# Patient Record
Sex: MALE | Race: WHITE | NOT HISPANIC OR LATINO | URBAN - METROPOLITAN AREA
[De-identification: names, ages, dates, MRNs, and addresses within clinical notes are randomized per-mention and may not be internally consistent; named-entity substitution may affect disease eponyms.]

---

## 2023-04-19 LAB
ALANINE AMINOTRANSFERASE (SGPT) (U/L) IN SER/PLAS: 17 U/L (ref 10–52)
ALBUMIN (G/DL) IN SER/PLAS: 4.3 G/DL (ref 3.4–5)
ALKALINE PHOSPHATASE (U/L) IN SER/PLAS: 69 U/L (ref 33–136)
ANION GAP IN SER/PLAS: 12 MMOL/L (ref 10–20)
APPEARANCE, URINE: CLEAR
ASPARTATE AMINOTRANSFERASE (SGOT) (U/L) IN SER/PLAS: 17 U/L (ref 9–39)
BASOPHILS (10*3/UL) IN BLOOD BY AUTOMATED COUNT: 0.07 X10E9/L (ref 0–0.1)
BASOPHILS/100 LEUKOCYTES IN BLOOD BY AUTOMATED COUNT: 1 % (ref 0–2)
BILIRUBIN TOTAL (MG/DL) IN SER/PLAS: 0.7 MG/DL (ref 0–1.2)
BILIRUBIN, URINE: NEGATIVE
BLOOD, URINE: NEGATIVE
CALCIUM (MG/DL) IN SER/PLAS: 9.3 MG/DL (ref 8.6–10.3)
CARBON DIOXIDE, TOTAL (MMOL/L) IN SER/PLAS: 25 MMOL/L (ref 21–32)
CHLORIDE (MMOL/L) IN SER/PLAS: 107 MMOL/L (ref 98–107)
CHOLESTEROL (MG/DL) IN SER/PLAS: 160 MG/DL (ref 0–199)
CHOLESTEROL IN HDL (MG/DL) IN SER/PLAS: 45.3 MG/DL
CHOLESTEROL/HDL RATIO: 3.5
COLOR, URINE: YELLOW
CREATININE (MG/DL) IN SER/PLAS: 1.33 MG/DL (ref 0.5–1.3)
EOSINOPHILS (10*3/UL) IN BLOOD BY AUTOMATED COUNT: 0.17 X10E9/L (ref 0–0.7)
EOSINOPHILS/100 LEUKOCYTES IN BLOOD BY AUTOMATED COUNT: 2.5 % (ref 0–6)
ERYTHROCYTE DISTRIBUTION WIDTH (RATIO) BY AUTOMATED COUNT: 13.2 % (ref 11.5–14.5)
ERYTHROCYTE MEAN CORPUSCULAR HEMOGLOBIN CONCENTRATION (G/DL) BY AUTOMATED: 32.4 G/DL (ref 32–36)
ERYTHROCYTE MEAN CORPUSCULAR VOLUME (FL) BY AUTOMATED COUNT: 94 FL (ref 80–100)
ERYTHROCYTES (10*6/UL) IN BLOOD BY AUTOMATED COUNT: 4.54 X10E12/L (ref 4.5–5.9)
GFR MALE: 59 ML/MIN/1.73M2
GLUCOSE (MG/DL) IN SER/PLAS: 95 MG/DL (ref 74–99)
GLUCOSE, URINE: NEGATIVE MG/DL
HEMATOCRIT (%) IN BLOOD BY AUTOMATED COUNT: 42.9 % (ref 41–52)
HEMOGLOBIN (G/DL) IN BLOOD: 13.9 G/DL (ref 13.5–17.5)
IMMATURE GRANULOCYTES/100 LEUKOCYTES IN BLOOD BY AUTOMATED COUNT: 0.3 % (ref 0–0.9)
KETONES, URINE: NEGATIVE MG/DL
LDL: 91 MG/DL (ref 0–99)
LEUKOCYTE ESTERASE, URINE: ABNORMAL
LEUKOCYTES (10*3/UL) IN BLOOD BY AUTOMATED COUNT: 6.8 X10E9/L (ref 4.4–11.3)
LYMPHOCYTES (10*3/UL) IN BLOOD BY AUTOMATED COUNT: 2.52 X10E9/L (ref 1.2–4.8)
LYMPHOCYTES/100 LEUKOCYTES IN BLOOD BY AUTOMATED COUNT: 37.2 % (ref 13–44)
MONOCYTES (10*3/UL) IN BLOOD BY AUTOMATED COUNT: 0.6 X10E9/L (ref 0.1–1)
MONOCYTES/100 LEUKOCYTES IN BLOOD BY AUTOMATED COUNT: 8.8 % (ref 2–10)
NEUTROPHILS (10*3/UL) IN BLOOD BY AUTOMATED COUNT: 3.4 X10E9/L (ref 1.2–7.7)
NEUTROPHILS/100 LEUKOCYTES IN BLOOD BY AUTOMATED COUNT: 50.2 % (ref 40–80)
NITRITE, URINE: NEGATIVE
PH, URINE: 5 (ref 5–8)
PLATELETS (10*3/UL) IN BLOOD AUTOMATED COUNT: 248 X10E9/L (ref 150–450)
POTASSIUM (MMOL/L) IN SER/PLAS: 4 MMOL/L (ref 3.5–5.3)
PROSTATE SPECIFIC AG (NG/ML) IN SER/PLAS: 0.55 NG/ML (ref 0–4)
PROTEIN TOTAL: 6.4 G/DL (ref 6.4–8.2)
PROTEIN, URINE: NEGATIVE MG/DL
RBC, URINE: <1 /HPF (ref 0–5)
SODIUM (MMOL/L) IN SER/PLAS: 140 MMOL/L (ref 136–145)
SPECIFIC GRAVITY, URINE: 1.02 (ref 1–1.03)
THYROTROPIN (MIU/L) IN SER/PLAS BY DETECTION LIMIT <= 0.05 MIU/L: 1.88 MIU/L (ref 0.44–3.98)
TRIGLYCERIDE (MG/DL) IN SER/PLAS: 117 MG/DL (ref 0–149)
UREA NITROGEN (MG/DL) IN SER/PLAS: 26 MG/DL (ref 6–23)
UROBILINOGEN, URINE: <2 MG/DL (ref 0–1.9)
VLDL: 23 MG/DL (ref 0–40)
WBC, URINE: 1 /HPF (ref 0–5)

## 2023-10-13 PROBLEM — M54.16 RADICULOPATHY, LUMBAR REGION: Status: ACTIVE | Noted: 2023-10-13

## 2023-10-13 PROBLEM — E03.9 ADULT HYPOTHYROIDISM: Status: ACTIVE | Noted: 2023-10-13

## 2023-10-13 PROBLEM — M51.9 LUMBOSACRAL DISC DISEASE: Status: ACTIVE | Noted: 2023-10-13

## 2023-10-13 PROBLEM — D12.6 TUBULAR ADENOMA OF COLON: Status: ACTIVE | Noted: 2023-10-13

## 2023-10-13 PROBLEM — H90.3 BILATERAL SENSORINEURAL HEARING LOSS: Status: ACTIVE | Noted: 2023-10-13

## 2023-10-13 PROBLEM — E78.5 HYPERLIPIDEMIA: Status: ACTIVE | Noted: 2023-10-13

## 2023-10-13 PROBLEM — I10 HYPERTENSION: Status: ACTIVE | Noted: 2023-10-13

## 2023-10-13 PROBLEM — S81.819A LACERATION OF LOWER LIMB: Status: ACTIVE | Noted: 2023-10-13

## 2023-10-13 PROBLEM — E55.9 VITAMIN D DEFICIENCY: Status: ACTIVE | Noted: 2023-10-13

## 2023-10-13 PROBLEM — E78.00 HYPERCHOLESTEROLEMIA: Status: ACTIVE | Noted: 2023-10-13

## 2023-10-13 PROBLEM — J30.9 ALLERGIC RHINITIS: Status: ACTIVE | Noted: 2023-10-13

## 2023-10-13 PROBLEM — M19.90 ARTHRITIS: Status: ACTIVE | Noted: 2023-10-13

## 2023-10-13 PROBLEM — R42 ORTHOSTATIC DIZZINESS: Status: ACTIVE | Noted: 2023-10-13

## 2023-10-13 PROBLEM — M48.062 SPINAL STENOSIS OF LUMBAR REGION WITH NEUROGENIC CLAUDICATION: Status: ACTIVE | Noted: 2023-10-13

## 2023-10-13 PROBLEM — M43.00 SPONDYLOLYSIS: Status: ACTIVE | Noted: 2023-10-13

## 2023-10-13 RX ORDER — ATORVASTATIN CALCIUM 40 MG/1
1 TABLET, FILM COATED ORAL DAILY
COMMUNITY
Start: 2017-04-20 | End: 2023-10-16 | Stop reason: SDUPTHER

## 2023-10-13 RX ORDER — LOSARTAN POTASSIUM AND HYDROCHLOROTHIAZIDE 12.5; 5 MG/1; MG/1
1 TABLET ORAL DAILY
COMMUNITY
Start: 2018-10-23 | End: 2023-10-16 | Stop reason: ALTCHOICE

## 2023-10-13 RX ORDER — MELOXICAM 15 MG/1
15 TABLET ORAL DAILY
COMMUNITY
Start: 2021-05-05 | End: 2023-10-16

## 2023-10-13 RX ORDER — MV-MIN/FOLIC/K1/LYCOPEN/LUTEIN 300-60 MCG
TABLET ORAL
COMMUNITY

## 2023-10-13 RX ORDER — CYCLOBENZAPRINE HCL 10 MG
TABLET ORAL
COMMUNITY
Start: 2022-08-10 | End: 2023-11-28 | Stop reason: SDUPTHER

## 2023-10-13 RX ORDER — VALSARTAN AND HYDROCHLOROTHIAZIDE 160; 12.5 MG/1; MG/1
TABLET, FILM COATED ORAL
COMMUNITY
Start: 2020-01-24 | End: 2023-10-16 | Stop reason: SDUPTHER

## 2023-10-16 ENCOUNTER — OFFICE VISIT (OUTPATIENT)
Dept: PRIMARY CARE | Facility: CLINIC | Age: 68
End: 2023-10-16
Payer: COMMERCIAL

## 2023-10-16 VITALS
SYSTOLIC BLOOD PRESSURE: 130 MMHG | HEART RATE: 63 BPM | HEIGHT: 74 IN | BODY MASS INDEX: 28.11 KG/M2 | WEIGHT: 219 LBS | OXYGEN SATURATION: 98 % | RESPIRATION RATE: 16 BRPM | DIASTOLIC BLOOD PRESSURE: 90 MMHG | TEMPERATURE: 97.5 F

## 2023-10-16 DIAGNOSIS — I10 HYPERTENSION, UNSPECIFIED TYPE: Primary | ICD-10-CM

## 2023-10-16 DIAGNOSIS — E78.00 PURE HYPERCHOLESTEROLEMIA: ICD-10-CM

## 2023-10-16 DIAGNOSIS — I10 ESSENTIAL HYPERTENSION, BENIGN: Primary | ICD-10-CM

## 2023-10-16 DIAGNOSIS — M51.9 LUMBOSACRAL DISC DISEASE: ICD-10-CM

## 2023-10-16 DIAGNOSIS — M19.90 ARTHRITIS: ICD-10-CM

## 2023-10-16 PROCEDURE — 1159F MED LIST DOCD IN RCRD: CPT | Performed by: INTERNAL MEDICINE

## 2023-10-16 PROCEDURE — 3080F DIAST BP >= 90 MM HG: CPT | Performed by: INTERNAL MEDICINE

## 2023-10-16 PROCEDURE — 3075F SYST BP GE 130 - 139MM HG: CPT | Performed by: INTERNAL MEDICINE

## 2023-10-16 PROCEDURE — 99214 OFFICE O/P EST MOD 30 MIN: CPT | Performed by: INTERNAL MEDICINE

## 2023-10-16 RX ORDER — VALSARTAN AND HYDROCHLOROTHIAZIDE 160; 12.5 MG/1; MG/1
1 TABLET, FILM COATED ORAL DAILY
Qty: 90 TABLET | Refills: 1 | Status: SHIPPED | OUTPATIENT
Start: 2023-10-16 | End: 2024-04-16 | Stop reason: SDUPTHER

## 2023-10-16 RX ORDER — ATORVASTATIN CALCIUM 40 MG/1
40 TABLET, FILM COATED ORAL DAILY
Qty: 90 TABLET | Refills: 1 | Status: SHIPPED | OUTPATIENT
Start: 2023-10-16 | End: 2024-03-04 | Stop reason: SDUPTHER

## 2023-10-16 ASSESSMENT — ENCOUNTER SYMPTOMS
ARTHRALGIAS: 1
NUMBNESS: 0
ABDOMINAL PAIN: 0
DIZZINESS: 0
PSYCHIATRIC NEGATIVE: 1
FREQUENCY: 0
ACTIVITY CHANGE: 0
PALPITATIONS: 0
JOINT SWELLING: 0
ADENOPATHY: 0
FEVER: 0
ENDOCRINE NEGATIVE: 1
DYSURIA: 0
COUGH: 0
FATIGUE: 0
BLOOD IN STOOL: 0
HEADACHES: 0
CONSTIPATION: 0
BACK PAIN: 1
ALLERGIC/IMMUNOLOGIC NEGATIVE: 1
SHORTNESS OF BREATH: 0
CHEST TIGHTNESS: 0

## 2023-10-16 NOTE — PROGRESS NOTES
"Subjective   Patient ID: Mu Wallace is a 67 y.o. male who presents for 6 Month Follow Up .    He is doing well, feels fine. He walks daily for exercise. . No fever , chest pain , dyspnea, hedache , dizziness, nausea , palpitations. No dysuria , constipation , bleeding.  He has seen chiropractor for back pain and he is doing more stretching exercises, and it has helped. He plays golf in summer.  He is retired.         Review of Systems   Constitutional:  Negative for activity change, fatigue and fever.   HENT:  Negative for congestion.    Eyes:  Negative for visual disturbance.   Respiratory:  Negative for cough, chest tightness and shortness of breath.    Cardiovascular:  Negative for chest pain, palpitations and leg swelling.   Gastrointestinal:  Negative for abdominal pain, blood in stool and constipation.   Endocrine: Negative.    Genitourinary:  Negative for dysuria and frequency.   Musculoskeletal:  Positive for arthralgias and back pain. Negative for gait problem and joint swelling.   Allergic/Immunologic: Negative.    Neurological:  Negative for dizziness, numbness and headaches.   Hematological:  Negative for adenopathy.   Psychiatric/Behavioral: Negative.         Objective   /90 (BP Location: Left arm, Patient Position: Sitting, BP Cuff Size: Adult)   Pulse 63   Temp 36.4 °C (97.5 °F)   Resp 16   Ht 1.88 m (6' 2\")   Wt 99.3 kg (219 lb)   SpO2 98%   BMI 28.12 kg/m²     Physical Exam  Constitutional:       Appearance: Normal appearance.      Comments: He wears glasses   HENT:      Nose: No congestion.      Mouth/Throat:      Mouth: Mucous membranes are moist.      Pharynx: Oropharynx is clear.   Eyes:      Conjunctiva/sclera: Conjunctivae normal.   Cardiovascular:      Rate and Rhythm: Normal rate and regular rhythm.      Pulses: Normal pulses.      Heart sounds: Normal heart sounds. No murmur heard.  Pulmonary:      Effort: Pulmonary effort is normal.      Breath sounds: Normal breath " sounds.   Abdominal:      General: Abdomen is flat. Bowel sounds are normal.      Palpations: Abdomen is soft.   Musculoskeletal:         General: Normal range of motion.      Cervical back: Normal range of motion and neck supple.      Right lower leg: No edema.      Left lower leg: No edema.   Skin:     General: Skin is warm and dry.      Capillary Refill: Capillary refill takes more than 3 seconds.   Neurological:      General: No focal deficit present.      Mental Status: He is alert and oriented to person, place, and time.   Psychiatric:         Mood and Affect: Mood normal.         Behavior: Behavior normal.         Assessment/Plan   Problem List Items Addressed This Visit       Arthritis, Osteo multiple sites  H/o left meniscus repair  Regular activity  Meloxicam   Stretching exercises  He has seen a chiropractor      Hyperlipidemia  Low fat diet  Statin      Hypertension   Valsartan hydrochlorothiazide  CANDELARIA diet      Lumbosacral disc disease  Pain control  Back stretching exercises  Meloxicam   Regular activity, he plays golf       Vision check  He is uptodate for immunizations  He had flushot, RSV and Covid booster  Labs reviewed , 04/23

## 2023-11-28 DIAGNOSIS — M51.9 LUMBOSACRAL DISC DISEASE: Primary | ICD-10-CM

## 2023-11-28 NOTE — TELEPHONE ENCOUNTER
Patient tweaked his back standing peeling potatoes on Thanksgiving.  Pain in hips and lower back.

## 2023-11-29 RX ORDER — CYCLOBENZAPRINE HCL 10 MG
10 TABLET ORAL NIGHTLY PRN
Qty: 30 TABLET | Refills: 0 | Status: SHIPPED | OUTPATIENT
Start: 2023-11-29 | End: 2024-04-16 | Stop reason: SDUPTHER

## 2024-03-04 DIAGNOSIS — E78.00 PURE HYPERCHOLESTEROLEMIA: ICD-10-CM

## 2024-03-04 RX ORDER — ATORVASTATIN CALCIUM 40 MG/1
40 TABLET, FILM COATED ORAL DAILY
Qty: 90 TABLET | Refills: 0 | Status: SHIPPED
Start: 2024-03-04 | End: 2024-04-16 | Stop reason: SDUPTHER

## 2024-04-15 DIAGNOSIS — I10 HYPERTENSION, UNSPECIFIED TYPE: ICD-10-CM

## 2024-04-15 DIAGNOSIS — Z00.00 WELLNESS EXAMINATION: Primary | ICD-10-CM

## 2024-04-15 DIAGNOSIS — M51.9 LUMBOSACRAL DISC DISEASE: ICD-10-CM

## 2024-04-15 DIAGNOSIS — E78.00 PURE HYPERCHOLESTEROLEMIA: ICD-10-CM

## 2024-04-16 ENCOUNTER — OFFICE VISIT (OUTPATIENT)
Dept: PRIMARY CARE | Facility: CLINIC | Age: 69
End: 2024-04-16
Payer: COMMERCIAL

## 2024-04-16 ENCOUNTER — LAB (OUTPATIENT)
Dept: LAB | Facility: LAB | Age: 69
End: 2024-04-16
Payer: COMMERCIAL

## 2024-04-16 VITALS
OXYGEN SATURATION: 97 % | HEART RATE: 76 BPM | WEIGHT: 220 LBS | BODY MASS INDEX: 27.35 KG/M2 | DIASTOLIC BLOOD PRESSURE: 80 MMHG | SYSTOLIC BLOOD PRESSURE: 122 MMHG | HEIGHT: 75 IN

## 2024-04-16 DIAGNOSIS — E78.00 PURE HYPERCHOLESTEROLEMIA: ICD-10-CM

## 2024-04-16 DIAGNOSIS — L84 CALLUS OF HEEL: Primary | ICD-10-CM

## 2024-04-16 DIAGNOSIS — M51.9 LUMBOSACRAL DISC DISEASE: ICD-10-CM

## 2024-04-16 DIAGNOSIS — Z00.00 WELLNESS EXAMINATION: ICD-10-CM

## 2024-04-16 DIAGNOSIS — M51.36 OTHER INTERVERTEBRAL DISC DEGENERATION, LUMBAR REGION: ICD-10-CM

## 2024-04-16 DIAGNOSIS — I10 ESSENTIAL HYPERTENSION, BENIGN: ICD-10-CM

## 2024-04-16 DIAGNOSIS — I10 HYPERTENSION, UNSPECIFIED TYPE: ICD-10-CM

## 2024-04-16 LAB
ALBUMIN SERPL BCP-MCNC: 4.3 G/DL (ref 3.4–5)
ALP SERPL-CCNC: 76 U/L (ref 33–136)
ALT SERPL W P-5'-P-CCNC: 16 U/L (ref 10–52)
ANION GAP SERPL CALC-SCNC: 9 MMOL/L (ref 10–20)
APPEARANCE UR: CLEAR
AST SERPL W P-5'-P-CCNC: 17 U/L (ref 9–39)
BASOPHILS # BLD AUTO: 0.06 X10*3/UL (ref 0–0.1)
BASOPHILS NFR BLD AUTO: 0.8 %
BILIRUB SERPL-MCNC: 0.8 MG/DL (ref 0–1.2)
BILIRUB UR STRIP.AUTO-MCNC: NEGATIVE MG/DL
BUN SERPL-MCNC: 17 MG/DL (ref 6–23)
CALCIUM SERPL-MCNC: 9 MG/DL (ref 8.6–10.3)
CHLORIDE SERPL-SCNC: 106 MMOL/L (ref 98–107)
CHOLEST SERPL-MCNC: 136 MG/DL (ref 0–199)
CHOLESTEROL/HDL RATIO: 3.3
CO2 SERPL-SCNC: 28 MMOL/L (ref 21–32)
COLOR UR: YELLOW
CREAT SERPL-MCNC: 1.21 MG/DL (ref 0.5–1.3)
EGFRCR SERPLBLD CKD-EPI 2021: 66 ML/MIN/1.73M*2
EOSINOPHIL # BLD AUTO: 0.21 X10*3/UL (ref 0–0.7)
EOSINOPHIL NFR BLD AUTO: 2.9 %
ERYTHROCYTE [DISTWIDTH] IN BLOOD BY AUTOMATED COUNT: 13.2 % (ref 11.5–14.5)
GLUCOSE SERPL-MCNC: 94 MG/DL (ref 74–99)
GLUCOSE UR STRIP.AUTO-MCNC: NEGATIVE MG/DL
HCT VFR BLD AUTO: 42.3 % (ref 41–52)
HDLC SERPL-MCNC: 41.6 MG/DL
HGB BLD-MCNC: 13.5 G/DL (ref 13.5–17.5)
IMM GRANULOCYTES # BLD AUTO: 0.02 X10*3/UL (ref 0–0.7)
IMM GRANULOCYTES NFR BLD AUTO: 0.3 % (ref 0–0.9)
KETONES UR STRIP.AUTO-MCNC: NEGATIVE MG/DL
LDLC SERPL CALC-MCNC: 67 MG/DL
LEUKOCYTE ESTERASE UR QL STRIP.AUTO: NEGATIVE
LYMPHOCYTES # BLD AUTO: 2.36 X10*3/UL (ref 1.2–4.8)
LYMPHOCYTES NFR BLD AUTO: 32.3 %
MCH RBC QN AUTO: 31 PG (ref 26–34)
MCHC RBC AUTO-ENTMCNC: 31.9 G/DL (ref 32–36)
MCV RBC AUTO: 97 FL (ref 80–100)
MONOCYTES # BLD AUTO: 0.7 X10*3/UL (ref 0.1–1)
MONOCYTES NFR BLD AUTO: 9.6 %
NEUTROPHILS # BLD AUTO: 3.95 X10*3/UL (ref 1.2–7.7)
NEUTROPHILS NFR BLD AUTO: 54.1 %
NITRITE UR QL STRIP.AUTO: NEGATIVE
NON HDL CHOLESTEROL: 94 MG/DL (ref 0–149)
NRBC BLD-RTO: 0 /100 WBCS (ref 0–0)
PH UR STRIP.AUTO: 6 [PH]
PLATELET # BLD AUTO: 267 X10*3/UL (ref 150–450)
POTASSIUM SERPL-SCNC: 3.3 MMOL/L (ref 3.5–5.3)
PROT SERPL-MCNC: 6.3 G/DL (ref 6.4–8.2)
PROT UR STRIP.AUTO-MCNC: NEGATIVE MG/DL
PSA SERPL-MCNC: 0.82 NG/ML
RBC # BLD AUTO: 4.35 X10*6/UL (ref 4.5–5.9)
RBC # UR STRIP.AUTO: NEGATIVE /UL
SODIUM SERPL-SCNC: 140 MMOL/L (ref 136–145)
SP GR UR STRIP.AUTO: 1.01
TRIGL SERPL-MCNC: 135 MG/DL (ref 0–149)
UROBILINOGEN UR STRIP.AUTO-MCNC: <2 MG/DL
VLDL: 27 MG/DL (ref 0–40)
WBC # BLD AUTO: 7.3 X10*3/UL (ref 4.4–11.3)

## 2024-04-16 PROCEDURE — 80061 LIPID PANEL: CPT

## 2024-04-16 PROCEDURE — 1159F MED LIST DOCD IN RCRD: CPT | Performed by: INTERNAL MEDICINE

## 2024-04-16 PROCEDURE — 84153 ASSAY OF PSA TOTAL: CPT

## 2024-04-16 PROCEDURE — 99214 OFFICE O/P EST MOD 30 MIN: CPT | Performed by: INTERNAL MEDICINE

## 2024-04-16 PROCEDURE — 80053 COMPREHEN METABOLIC PANEL: CPT

## 2024-04-16 PROCEDURE — 81003 URINALYSIS AUTO W/O SCOPE: CPT

## 2024-04-16 PROCEDURE — 3079F DIAST BP 80-89 MM HG: CPT | Performed by: INTERNAL MEDICINE

## 2024-04-16 PROCEDURE — 99397 PER PM REEVAL EST PAT 65+ YR: CPT | Performed by: INTERNAL MEDICINE

## 2024-04-16 PROCEDURE — 36415 COLL VENOUS BLD VENIPUNCTURE: CPT

## 2024-04-16 PROCEDURE — 3074F SYST BP LT 130 MM HG: CPT | Performed by: INTERNAL MEDICINE

## 2024-04-16 PROCEDURE — 85025 COMPLETE CBC W/AUTO DIFF WBC: CPT

## 2024-04-16 RX ORDER — MELOXICAM 15 MG/1
TABLET ORAL
Qty: 90 TABLET | Refills: 1 | Status: SHIPPED | OUTPATIENT
Start: 2024-04-16

## 2024-04-16 RX ORDER — VALSARTAN AND HYDROCHLOROTHIAZIDE 160; 12.5 MG/1; MG/1
1 TABLET, FILM COATED ORAL DAILY
Qty: 90 TABLET | Refills: 1 | Status: SHIPPED | OUTPATIENT
Start: 2024-04-16 | End: 2024-05-30

## 2024-04-16 RX ORDER — CYCLOBENZAPRINE HCL 10 MG
10 TABLET ORAL NIGHTLY PRN
Qty: 30 TABLET | Refills: 0 | Status: SHIPPED | OUTPATIENT
Start: 2024-04-16

## 2024-04-16 RX ORDER — ATORVASTATIN CALCIUM 40 MG/1
40 TABLET, FILM COATED ORAL DAILY
Qty: 90 TABLET | Refills: 1 | Status: SHIPPED | OUTPATIENT
Start: 2024-04-16 | End: 2024-06-11

## 2024-04-16 ASSESSMENT — ENCOUNTER SYMPTOMS
COUGH: 0
DYSURIA: 0
HEADACHES: 0
CONSTIPATION: 0
PSYCHIATRIC NEGATIVE: 1
JOINT SWELLING: 0
BLOOD IN STOOL: 0
FATIGUE: 0
CHEST TIGHTNESS: 0
DIZZINESS: 0
ADENOPATHY: 0
BACK PAIN: 1
ACTIVITY CHANGE: 0
NUMBNESS: 0
PALPITATIONS: 0
FEVER: 0
SHORTNESS OF BREATH: 0
ABDOMINAL PAIN: 0
FREQUENCY: 0
ARTHRALGIAS: 1
ENDOCRINE NEGATIVE: 1
ALLERGIC/IMMUNOLOGIC NEGATIVE: 1

## 2024-04-16 NOTE — PROGRESS NOTES
Subjective   Patient ID: Mu Wallace is a 68 y.o. male who presents for Follow-up (6 MONTH FOLLOW UP).  And Wellness Exam    He is doing well, feels fine. He walks daily for exercise. . No fever , chest pain , dyspnea, hedache , dizziness, nausea , palpitations. No dysuria , constipation , bleeding.  He has seen chiropractor for back pain and he is doing more stretching exercises, and it has helped. He plays golf in summer.  He is retired. He has right foot callus and great toenail fungal infection       PMH:  Immun/Inj. Record:  91300-Covid 19 Pfizer Sars-Cov-2 vac mRNA, LNP-S, PF, 30 mcg/ 0.3 mL 00/00/00 00/00/00 10/22/21  90750-Shingrix (Shingles) Vaccine Recombinant  [id number] 22  11387-Zdubuqjy  0.65 ML [id number] 22  30974-Eyxzkvzncgss Vaccine 22  90715-Tdap Vaccine 7 Yrs &>  NDC 86629-100-91 09/15/21  31134-Tec Quadrivalent 54121-435-94 21    Medical Problems:  Essential Hypertension, Hyperlipidemia  Osteoarthritis - Knees and back , hips  Degenerative Disk Disease - L-S spine  Seasonal Allergies  Accidents:  None  Colonoscopy , Dr Smiley at [facility]   Left knee meniscus repair  L-S spine , Dr Gume Allison           FH:  Father:  , CVA , MI.  Mother:  , old age.  Sister 1:  , Seizure disorder.    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  Personal Habits:  Cigarette Use: Former Cigarette Smoker - quit for 5 years , smoked 1 ppd.Alcohol: Denies alcohol use.Drug Use: Current Drug User - Marijuana.    Review of Systems   Constitutional:  Negative for activity change, fatigue and fever.   HENT:  Negative for congestion.    Eyes:  Negative for visual disturbance.   Respiratory:  Negative for cough, chest tightness and shortness of breath.    Cardiovascular:  Negative for chest pain, palpitations and leg swelling.   Gastrointestinal:  Negative for abdominal pain, blood in stool and constipation.   Endocrine: Negative.   "  Genitourinary:  Negative for dysuria and frequency.   Musculoskeletal:  Positive for arthralgias and back pain. Negative for gait problem and joint swelling.   Allergic/Immunologic: Negative.    Neurological:  Negative for dizziness, numbness and headaches.   Hematological:  Negative for adenopathy.   Psychiatric/Behavioral: Negative.         Objective   /90 (BP Location: Left arm, Patient Position: Sitting, BP Cuff Size: Adult)   Pulse 63   Temp 36.4 °C (97.5 °F)   Resp 16   Ht 1.88 m (6' 2\")   Wt 99.3 kg (219 lb)   SpO2 98%   BMI 28.12 kg/m²     Physical Exam  Constitutional:       Appearance: Normal appearance.      Comments: He wears glasses   HENT:      Nose: No congestion.      Mouth/Throat:      Mouth: Mucous membranes are moist.      Pharynx: Oropharynx is clear.   Eyes:      Conjunctiva/sclera: Conjunctivae normal.   Cardiovascular:      Rate and Rhythm: Normal rate and regular rhythm.      Pulses: Normal pulses.      Heart sounds: Normal heart sounds. No murmur heard.  Pulmonary:      Effort: Pulmonary effort is normal.      Breath sounds: Normal breath sounds.   Abdominal:      General: Abdomen is flat. Bowel sounds are normal.      Palpations: Abdomen is soft.   Musculoskeletal:         General: Normal range of motion.      Cervical back: Normal range of motion and neck supple.      Right lower leg: No edema.      Left lower leg: No edema.   Skin:     General: Skin is warm and dry.      Capillary Refill: Capillary refill takes more than 3 seconds.   Neurological:      General: No focal deficit present.      Mental Status: He is alert and oriented to person, place, and time.   Psychiatric:         Mood and Affect: Mood normal.         Behavior: Behavior normal.         Assessment/Plan   Problem List Items Addressed This Visit       Arthritis, Osteo multiple sites  H/o left meniscus repair  Regular activity  Meloxicam   Stretching exercises  He has seen a chiropractor      " Hyperlipidemia  Low fat diet  Statin      Hypertension   Valsartan hydrochlorothiazide  CANDELARIA diet      Lumbosacral disc disease  Pain control  Back stretching exercises  Meloxicam   Voltaren Gel , Lidocaine patches   Regular activity, he plays golf       Vision check  He is uptodate for immunizations  He had flushot, RSV and Covid booster  Check Labs   Colonoscopy , not due. Last done 06/21  Refer to Dr DAO for feet problems

## 2024-04-17 ENCOUNTER — TELEPHONE (OUTPATIENT)
Dept: PRIMARY CARE | Facility: CLINIC | Age: 69
End: 2024-04-17
Payer: COMMERCIAL

## 2024-04-17 NOTE — TELEPHONE ENCOUNTER
----- Message from Terrell Moore MD sent at 4/17/2024  8:30 AM EDT -----  Unremarkable labs  K 3.3 , slight low  No new recommendations

## 2024-05-24 ENCOUNTER — APPOINTMENT (OUTPATIENT)
Dept: PODIATRY | Facility: CLINIC | Age: 69
End: 2024-05-24
Payer: COMMERCIAL

## 2024-05-30 ENCOUNTER — OFFICE VISIT (OUTPATIENT)
Dept: PODIATRY | Facility: CLINIC | Age: 69
End: 2024-05-30
Payer: COMMERCIAL

## 2024-05-30 DIAGNOSIS — L84 CORNS AND CALLOSITIES: ICD-10-CM

## 2024-05-30 DIAGNOSIS — M79.672 LEFT FOOT PAIN: ICD-10-CM

## 2024-05-30 DIAGNOSIS — L84 CALLUS OF HEEL: ICD-10-CM

## 2024-05-30 DIAGNOSIS — M79.671 RIGHT FOOT PAIN: ICD-10-CM

## 2024-05-30 DIAGNOSIS — B35.1 TINEA UNGUIUM: Primary | ICD-10-CM

## 2024-05-30 PROCEDURE — 1159F MED LIST DOCD IN RCRD: CPT | Performed by: PODIATRIST

## 2024-05-30 PROCEDURE — 1160F RVW MEDS BY RX/DR IN RCRD: CPT | Performed by: PODIATRIST

## 2024-05-30 PROCEDURE — 11055 PARING/CUTG B9 HYPRKER LES 1: CPT | Performed by: PODIATRIST

## 2024-05-30 PROCEDURE — 99202 OFFICE O/P NEW SF 15 MIN: CPT | Performed by: PODIATRIST

## 2024-05-31 NOTE — PROGRESS NOTES
CC: calluses, nail fungus    HPI:  Patient see as new pt with calluses to the feet and nail fungus, present for a period of time, has tried some otc callus remover.    PCP: Dr. Moore  Last visit: 4-16-24     PMH  Past Medical History:   Diagnosis Date    Hyperlipidemia, unspecified 07/15/2018    Hyperlipidemia    Unspecified osteoarthritis, unspecified site 07/15/2018    Arthritis     MEDS    Current Outpatient Medications:     atorvastatin (Lipitor) 40 mg tablet, Take 1 tablet (40 mg) by mouth once daily., Disp: 90 tablet, Rfl: 1    cholecalciferol, vitamin D3, 100 mcg (4,000 unit) capsule, Take 1 capsule (100 mcg) by mouth once daily., Disp: , Rfl:     cyclobenzaprine (Flexeril) 10 mg tablet, Take 1 tablet (10 mg) by mouth as needed at bedtime for muscle spasms., Disp: 30 tablet, Rfl: 0    meloxicam (Mobic) 15 mg tablet, TAKE 1 TAB BY MOUTH EVERY DAY WITH MEAL/FOOD, Disp: 90 tablet, Rfl: 1    mv-min-folic-K1-lycopen-lutein (Centrum Silver Ultra Men's) 790--300 mcg tablet, Take by mouth., Disp: , Rfl:     valsartan-hydrochlorothiazide (Diovan-HCT) 160-12.5 mg tablet, TAKE ONE TABLET BY MOUTH ONCE DAILY, Disp: 90 tablet, Rfl: 0  Allergies  No Known Allergies  Social History     Socioeconomic History    Marital status:      Spouse name: None    Number of children: None    Years of education: None    Highest education level: None   Occupational History    None   Tobacco Use    Smoking status: None    Smokeless tobacco: None   Substance and Sexual Activity    Alcohol use: None    Drug use: None    Sexual activity: None   Other Topics Concern    None   Social History Narrative    None     Social Determinants of Health     Financial Resource Strain: Not on file   Food Insecurity: Not on file   Transportation Needs: Not on file   Physical Activity: Not on file   Stress: Not on file   Social Connections: Not on file   Intimate Partner Violence: Not on file   Housing Stability: Not on file     Family History    Problem Relation Name Age of Onset    Other (cardiac disorder) Father       Past Surgical History:   Procedure Laterality Date    BACK SURGERY  04/03/2018    Lower Back Surgery    KNEE SURGERY  04/06/2018    Knee Surgery       REVIEW OF SYSTEMS    + as noted above in HPI.       Physical examination:   On General Observation: Patient is a pleasant, cooperative, well developed 68 y.o.  adult male. The patient is alert and oriented to time, place and person. Patient has normal affect and mood.  There were no vitals taken for this visit.    Vascular:  DP and PT pulses are 2/4 b/l.  no edema noted. no varicosities b/l.  CFT  5 seconds to all digits bilateral.  Skin temperature is warm to warm from proximal to distal bilateral.      Muscular: Strength is 5/5 b/l.  Motor strength is normal and symmetric proximally, as well as distally, in all four extremities. Good mobility of all extremities.  Tenderness to feet.     Neuro:  Proprioception present.   Sensation to vibration is  present. Protective sensation intact  at all pedal sites via [place] Suzie 5.07 monofilament bilateral.  Light touch present bilateral.     Derm:  callus is present sub 5th metatarsal head right foot, nail b/l le are thick and crumbly.         ASSESSMENT:    Nail fungus  Callus foot  Pain feet     PLAN:   Consult  A comprehensive history and physical examination were preformed. The patient was educated on clinical findings, diagnosis and treatment plans. Patient understands all that has been explained and all questions were answered to apparent satisfaction.   -reviewed etiology of the above, debrided the callus right foot with 15 blade, reviewed options for nail fungus, will start topical formula 3, did review possible orthotics, follow up 6 months.    Thank you for referral      Adeel John DPM

## 2024-10-16 ENCOUNTER — APPOINTMENT (OUTPATIENT)
Dept: PRIMARY CARE | Facility: CLINIC | Age: 69
End: 2024-10-16
Payer: COMMERCIAL

## 2024-10-16 VITALS
HEART RATE: 66 BPM | TEMPERATURE: 97 F | OXYGEN SATURATION: 96 % | SYSTOLIC BLOOD PRESSURE: 138 MMHG | BODY MASS INDEX: 26.98 KG/M2 | RESPIRATION RATE: 18 BRPM | HEIGHT: 75 IN | WEIGHT: 217 LBS | DIASTOLIC BLOOD PRESSURE: 78 MMHG

## 2024-10-16 DIAGNOSIS — E78.00 PURE HYPERCHOLESTEROLEMIA: ICD-10-CM

## 2024-10-16 DIAGNOSIS — I10 ESSENTIAL HYPERTENSION, BENIGN: ICD-10-CM

## 2024-10-16 DIAGNOSIS — M51.9 LUMBOSACRAL DISC DISEASE: ICD-10-CM

## 2024-10-16 PROCEDURE — 1036F TOBACCO NON-USER: CPT | Performed by: INTERNAL MEDICINE

## 2024-10-16 PROCEDURE — 3075F SYST BP GE 130 - 139MM HG: CPT | Performed by: INTERNAL MEDICINE

## 2024-10-16 PROCEDURE — 99214 OFFICE O/P EST MOD 30 MIN: CPT | Performed by: INTERNAL MEDICINE

## 2024-10-16 PROCEDURE — 3008F BODY MASS INDEX DOCD: CPT | Performed by: INTERNAL MEDICINE

## 2024-10-16 PROCEDURE — 3078F DIAST BP <80 MM HG: CPT | Performed by: INTERNAL MEDICINE

## 2024-10-16 PROCEDURE — 1159F MED LIST DOCD IN RCRD: CPT | Performed by: INTERNAL MEDICINE

## 2024-10-16 RX ORDER — MELOXICAM 15 MG/1
TABLET ORAL
Qty: 90 TABLET | Refills: 1 | Status: SHIPPED | OUTPATIENT
Start: 2024-10-16

## 2024-10-16 RX ORDER — VALSARTAN AND HYDROCHLOROTHIAZIDE 160; 12.5 MG/1; MG/1
1 TABLET, FILM COATED ORAL DAILY
Qty: 90 TABLET | Refills: 1 | Status: SHIPPED | OUTPATIENT
Start: 2024-10-16

## 2024-10-16 RX ORDER — CYCLOBENZAPRINE HCL 10 MG
10 TABLET ORAL NIGHTLY PRN
Qty: 30 TABLET | Refills: 0 | Status: SHIPPED | OUTPATIENT
Start: 2024-10-16

## 2024-10-16 RX ORDER — ATORVASTATIN CALCIUM 40 MG/1
40 TABLET, FILM COATED ORAL DAILY
Qty: 90 TABLET | Refills: 1 | Status: SHIPPED | OUTPATIENT
Start: 2024-10-16

## 2024-10-16 ASSESSMENT — ENCOUNTER SYMPTOMS
ARTHRALGIAS: 1
FEVER: 0
ENDOCRINE NEGATIVE: 1
HEADACHES: 0
ADENOPATHY: 0
PSYCHIATRIC NEGATIVE: 1
JOINT SWELLING: 0
CONSTIPATION: 0
ABDOMINAL PAIN: 0
BLOOD IN STOOL: 0
DYSURIA: 0
BACK PAIN: 1
ACTIVITY CHANGE: 0
PALPITATIONS: 0
ALLERGIC/IMMUNOLOGIC NEGATIVE: 1
COUGH: 0
SHORTNESS OF BREATH: 0
NUMBNESS: 0
CHEST TIGHTNESS: 0
DIZZINESS: 0
FATIGUE: 0
FREQUENCY: 0

## 2024-10-16 NOTE — PROGRESS NOTES
Subjective   Patient ID: Mu Wallace is a 68 y.o. male who presents for Follow-up (6 months).  And Wellness Exam    He is doing well, feels fine. He walks daily for exercise. . No fever , chest pain , dyspnea, hedache , dizziness, nausea , palpitations. No dysuria , constipation , bleeding.  He has seen chiropractor for back pain and he is doing more stretching exercises, and it has helped. He plays golf in summer.He is retired. He saw Dr DAO , Podiatry , denies any current feet problems.       PMH:  Immun/Inj. Record:  91300-Covid 19 Pfizer Sars-Cov-2 vac mRNA, LNP-S, PF, 30 mcg/ 0.3 mL 00/00/00 00/00/00 10/22/21  90750-Shingrix (Shingles) Vaccine Recombinant  [address]-31 22  08446-Aazehimv  0.65 ML [id number] 22  67017-Yyhwvtahjlcp Vaccine 22  90715-Tdap Vaccine 7 Yrs &>  NDC 11861-085-28 09/15/21  03359-Jtw Quadrivalent [id number] 21    Medical Problems:  Essential Hypertension, Hyperlipidemia  Osteoarthritis - Knees and back , hips  Degenerative Disk Disease - L-S spine  Seasonal Allergies  Accidents:  None  Colonoscopy , Dr Smiley at [facility]   Left knee meniscus repair  L-S spine , Dr Gume Allison           FH:  Father:  , CVA , MI.  Mother:  , old age.  Sister 1:  , Seizure disorder.    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 He has retired.  Personal Habits:  Cigarette Use: Former Cigarette Smoker - quit for 5 years , smoked 1 ppd.Alcohol: Denies alcohol use.Drug Use: Current Drug User - Marijuana.    Review of Systems   Constitutional:  Negative for activity change, fatigue and fever.   HENT:  Negative for congestion.    Eyes:  Negative for visual disturbance.   Respiratory:  Negative for cough, chest tightness and shortness of breath.    Cardiovascular:  Negative for chest pain, palpitations and leg swelling.   Gastrointestinal:  Negative for abdominal pain, blood in stool and constipation.   Endocrine: Negative.    Genitourinary:   "Negative for dysuria and frequency.   Musculoskeletal:  Positive for arthralgias and back pain. Negative for gait problem and joint swelling.        Bilateral wrist, hand pain   Allergic/Immunologic: Negative.    Neurological:  Negative for dizziness, numbness and headaches.   Hematological:  Negative for adenopathy.   Psychiatric/Behavioral: Negative.         Objective   10/16/2024     8:01 AM      /78   BP Location Left arm   Patient Position Sitting   BP Cuff Size Adult   Heart Rate 66   Temp 36.1 °C (97 °F)   Temp Source Temporal   Weight 98.4 kg (217 lb)   Height 1.905 m (6' 3\")   Resp 18   SpO2 96 %     BMI: 27.12 kg/m2   BSA: 2.28 m2         Physical Exam  Constitutional:       Appearance: Normal appearance.      Comments: He wears glasses   HENT:      Nose: No congestion.      Mouth/Throat:      Mouth: Mucous membranes are moist.      Pharynx: Oropharynx is clear.   Eyes:      Conjunctiva/sclera: Conjunctivae normal.   Cardiovascular:      Rate and Rhythm: Normal rate and regular rhythm.      Pulses: Normal pulses.      Heart sounds: Normal heart sounds. No murmur heard.  Pulmonary:      Effort: Pulmonary effort is normal.      Breath sounds: Normal breath sounds.   Abdominal:      General: Abdomen is flat. Bowel sounds are normal.      Palpations: Abdomen is soft.   Musculoskeletal:         General: Normal range of motion.      Cervical back: Normal range of motion and neck supple.      Right lower leg: No edema.      Left lower leg: No edema.   Skin:     General: Skin is warm and dry.      Capillary Refill: Capillary refill takes less than 2 seconds.   Neurological:      General: No focal deficit present.      Mental Status: He is alert and oriented to person, place, and time.      Motor: No weakness.      Gait: Gait normal.   Psychiatric:         Mood and Affect: Mood normal.         Behavior: Behavior normal.         Assessment/Plan   Problem List Items Addressed This Visit       Arthritis, Osteo " multiple sites  H/o left meniscus repair  Regular activity , otc analgesics  Stretching exercises  He has seen a chiropractor in past      Hyperlipidemia  Low fat diet  Statin      Hypertension   Valsartan hydrochlorothiazide  CANDELARIA diet      Lumbosacral disc disease  Pain control  Back stretching exercises  Meloxicam   Voltaren Gel , Lidocaine patches   Regular activity, he plays golf       Vision check , he wears glasses  He is uptodate for immunizations  He had flushot, RSV and Covid booster  Labs reviewed  Colonoscopy , not due. Last done 06/21

## 2024-12-04 ENCOUNTER — APPOINTMENT (OUTPATIENT)
Dept: PODIATRY | Facility: CLINIC | Age: 69
End: 2024-12-04
Payer: COMMERCIAL

## 2025-02-11 ENCOUNTER — TELEMEDICINE (OUTPATIENT)
Dept: PRIMARY CARE | Facility: CLINIC | Age: 70
End: 2025-02-11
Payer: COMMERCIAL

## 2025-02-11 DIAGNOSIS — B34.9 ACUTE VIRAL SYNDROME: Primary | ICD-10-CM

## 2025-02-11 NOTE — PROGRESS NOTES
Virtual or Telephone Consent    An interactive audio and video telecommunication system which permits real time communications between the patient (at the originating site) and provider (at the distant site) was utilized to provide this telehealth service.   Verbal consent was requested and obtained from Mu Wallace on this date, 02/11/25 for a telehealth visit.     Subjective   Mu Wallace is a 69 y.o. male who presents for evaluation of symptoms of a URI, acute viral illness . Symptoms include congestion, nasal congestion, no  fever, and diarrhoea.  . Onset of symptoms was 4 days ago and has been gradually improving since that time. Treatment to date: decongestants.  He did not do viral test at home , had flushot earlier.  Objective   Physical Exam   Tele phonecall visit  Assessment/Plan   viral upper respiratory illness and probably Influenza  .    Discussed diagnosis and treatment of URI.  Suggested symptomatic OTC remedies.  Nasal saline spray for congestion.  Follow up as needed.  Hydration , analgesics, decongestants  [place] diet , and po fluids  Supportive care

## 2025-04-15 DIAGNOSIS — Z00.00 ADULT WELLNESS VISIT: ICD-10-CM

## 2025-04-15 DIAGNOSIS — I10 ESSENTIAL HYPERTENSION, BENIGN: Primary | ICD-10-CM

## 2025-04-15 DIAGNOSIS — E78.00 PURE HYPERCHOLESTEROLEMIA: ICD-10-CM

## 2025-04-16 ENCOUNTER — APPOINTMENT (OUTPATIENT)
Dept: PRIMARY CARE | Facility: CLINIC | Age: 70
End: 2025-04-16
Payer: COMMERCIAL

## 2025-04-16 VITALS
BODY MASS INDEX: 27.35 KG/M2 | WEIGHT: 220 LBS | HEART RATE: 72 BPM | DIASTOLIC BLOOD PRESSURE: 84 MMHG | OXYGEN SATURATION: 96 % | HEIGHT: 75 IN | SYSTOLIC BLOOD PRESSURE: 128 MMHG

## 2025-04-16 DIAGNOSIS — M51.9 LUMBOSACRAL DISC DISEASE: ICD-10-CM

## 2025-04-16 DIAGNOSIS — M15.9 OSTEOARTHROSIS, GENERALIZED, INVOLVING MULTIPLE SITES: ICD-10-CM

## 2025-04-16 DIAGNOSIS — E78.00 PURE HYPERCHOLESTEROLEMIA: ICD-10-CM

## 2025-04-16 DIAGNOSIS — Z00.00 ADULT WELLNESS VISIT: Primary | ICD-10-CM

## 2025-04-16 DIAGNOSIS — I10 ESSENTIAL HYPERTENSION, BENIGN: ICD-10-CM

## 2025-04-16 PROCEDURE — 3008F BODY MASS INDEX DOCD: CPT | Performed by: INTERNAL MEDICINE

## 2025-04-16 PROCEDURE — 1036F TOBACCO NON-USER: CPT | Performed by: INTERNAL MEDICINE

## 2025-04-16 PROCEDURE — 3079F DIAST BP 80-89 MM HG: CPT | Performed by: INTERNAL MEDICINE

## 2025-04-16 PROCEDURE — 99397 PER PM REEVAL EST PAT 65+ YR: CPT | Performed by: INTERNAL MEDICINE

## 2025-04-16 PROCEDURE — 1159F MED LIST DOCD IN RCRD: CPT | Performed by: INTERNAL MEDICINE

## 2025-04-16 PROCEDURE — 99214 OFFICE O/P EST MOD 30 MIN: CPT | Performed by: INTERNAL MEDICINE

## 2025-04-16 PROCEDURE — 3074F SYST BP LT 130 MM HG: CPT | Performed by: INTERNAL MEDICINE

## 2025-04-16 RX ORDER — MELOXICAM 15 MG/1
TABLET ORAL
Qty: 90 TABLET | Refills: 1 | Status: SHIPPED | OUTPATIENT
Start: 2025-04-16

## 2025-04-16 RX ORDER — ATORVASTATIN CALCIUM 40 MG/1
40 TABLET, FILM COATED ORAL DAILY
Qty: 90 TABLET | Refills: 1 | Status: SHIPPED | OUTPATIENT
Start: 2025-04-16

## 2025-04-16 RX ORDER — VALSARTAN AND HYDROCHLOROTHIAZIDE 160; 12.5 MG/1; MG/1
1 TABLET, FILM COATED ORAL DAILY
Qty: 90 TABLET | Refills: 1 | Status: SHIPPED | OUTPATIENT
Start: 2025-04-16

## 2025-04-16 ASSESSMENT — ENCOUNTER SYMPTOMS
CONSTIPATION: 0
PALPITATIONS: 0
SHORTNESS OF BREATH: 0
DIZZINESS: 0
BLOOD IN STOOL: 0
COUGH: 0
NUMBNESS: 0
HEADACHES: 0
FREQUENCY: 0
ADENOPATHY: 0
PSYCHIATRIC NEGATIVE: 1
ACTIVITY CHANGE: 0
JOINT SWELLING: 0
CHEST TIGHTNESS: 0
ALLERGIC/IMMUNOLOGIC NEGATIVE: 1
BACK PAIN: 1
FEVER: 0
FATIGUE: 0
ABDOMINAL PAIN: 0
ENDOCRINE NEGATIVE: 1
DYSURIA: 0
ARTHRALGIAS: 1

## 2025-04-16 NOTE — PROGRESS NOTES
Subjective   Patient ID: Mu Wallace is a 69 y.o. male who presents for Follow-up.  And Wellness Exam , check labs    He is doing well, feels fine. He walks daily for exercise. . No fever , chest pain , dyspnea, hedache , dizziness, nausea , palpitations. No dysuria , constipation , bleeding.  He has seen chiropractor for back pain and he is doing more stretching exercises, and it has helped. He plays golf in summer.He is retired. He saw Dr DAO , Podiatry , denies any current feet problems.  He plays basketball , and golf in summer , uses stationary bike , walks daily for exercise.       PMH:  Immun/Inj. Record:  91300-Covid 19 Pfizer Sars-Cov-2 vac mRNA, LNP-S, PF, 30 mcg/ 0.3 mL 00/00/00 00/00/00 10/22/21  90750-Shingrix (Shingles) Vaccine Recombinant  [id number] 22  59629-Ivajeupv  0.65 ML [id number] 22  80922-Txigvxgfhhhh Vaccine 22  90715-Tdap Vaccine 7 Yrs &>  NDC 52061-529-24 09/15/21  74043-Dkd Quadrivalent [id number] 21    Medical Problems:  Essential Hypertension, Hyperlipidemia  Osteoarthritis - Knees and back , hips  Degenerative Disk Disease - L-S spine  Seasonal Allergies  Accidents:  None  Colonoscopy , Dr Smiley at [facility]   Left knee meniscus repair  L-S spine , Dr Gume Allison           FH:  Father:  , CVA , MI.  Mother:  , old age.  Sister 1:  , Seizure disorder.    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 He has retired.  Personal Habits:  Cigarette Use: Former Cigarette Smoker - quit for 5 years , smoked 1 ppd.Alcohol: Denies alcohol use.Drug Use: Current Drug User - Marijuana.    Review of Systems   Constitutional:  Negative for activity change, fatigue and fever.   HENT:  Positive for hearing loss. Negative for congestion.    Eyes:  Negative for visual disturbance.   Respiratory:  Negative for cough, chest tightness and shortness of breath.    Cardiovascular:  Negative for chest pain, palpitations and leg swelling.  "  Gastrointestinal:  Negative for abdominal pain, blood in stool and constipation.   Endocrine: Negative.    Genitourinary:  Negative for dysuria and frequency.   Musculoskeletal:  Positive for arthralgias and back pain. Negative for gait problem and joint swelling.   Allergic/Immunologic: Negative.    Neurological:  Negative for dizziness, numbness and headaches.   Hematological:  Negative for adenopathy.   Psychiatric/Behavioral: Negative.         Objective   4/16/2025     8:00 AM      /84   BP Location Right arm   Patient Position Sitting   BP Cuff Size Adult   Heart Rate 72   Weight 99.8 kg (220 lb)   Height 1.905 m (6' 3\")   SpO2 96 %         Tobacco     Smoking status: Never   Smokeless status: Never   Reviewed: 4/16/2025   Other Vitals     BMI: 27.50 kg/m2   BSA: 2.30 m2            Physical Exam  Constitutional:       Appearance: Normal appearance.      Comments: He wears glasses   HENT:      Right Ear: Tympanic membrane normal. There is no impacted cerumen.      Left Ear: Tympanic membrane normal. There is no impacted cerumen.      Nose: No congestion.      Mouth/Throat:      Mouth: Mucous membranes are moist.      Pharynx: Oropharynx is clear.   Eyes:      Conjunctiva/sclera: Conjunctivae normal.   Neck:      Vascular: No carotid bruit.   Cardiovascular:      Rate and Rhythm: Normal rate and regular rhythm.      Pulses: Normal pulses.      Heart sounds: Normal heart sounds. No murmur heard.  Pulmonary:      Effort: Pulmonary effort is normal.      Breath sounds: Normal breath sounds.   Abdominal:      General: Abdomen is flat. Bowel sounds are normal.      Palpations: Abdomen is soft.   Musculoskeletal:         General: Normal range of motion.      Cervical back: Normal range of motion and neck supple.      Right lower leg: No edema.      Left lower leg: No edema.   Skin:     General: Skin is warm and dry.      Capillary Refill: Capillary refill takes less than 2 seconds.   Neurological:      General: " No focal deficit present.      Mental Status: He is alert and oriented to person, place, and time.      Motor: No weakness.      Gait: Gait normal.   Psychiatric:         Mood and Affect: Mood normal.         Behavior: Behavior normal.         Assessment/Plan   Problem List Items Addressed This Visit       Arthritis, Osteo multiple sites  H/o left meniscus repair  Regular activity , otc analgesics  Stretching exercises  He has seen a chiropractor in past      Hyperlipidemia  Low fat diet  Statin      Hypertension   Valsartan hydrochlorothiazide  CANDELARIA diet      Lumbosacral disc disease  Pain control  Back stretching exercises  Meloxicam   Voltaren Gel , Lidocaine patches   Regular activity, he plays golf       Vision check , he wears glasses  He is uptodate for immunizations  He had flushot, RSV and Covid booster  Check labs  Colonoscopy , not due. Last done 06/21  See Audiology , may need hearing aids

## 2025-04-17 LAB
ALBUMIN SERPL-MCNC: 4.5 G/DL (ref 3.6–5.1)
ALP SERPL-CCNC: 80 U/L (ref 35–144)
ALT SERPL-CCNC: 23 U/L (ref 9–46)
ANION GAP SERPL CALCULATED.4IONS-SCNC: 9 MMOL/L (CALC) (ref 7–17)
APPEARANCE UR: CLEAR
AST SERPL-CCNC: 20 U/L (ref 10–35)
BASOPHILS # BLD AUTO: 64 CELLS/UL (ref 0–200)
BASOPHILS NFR BLD AUTO: 0.7 %
BILIRUB SERPL-MCNC: 0.7 MG/DL (ref 0.2–1.2)
BILIRUB UR QL STRIP: NEGATIVE
BUN SERPL-MCNC: 19 MG/DL (ref 7–25)
CALCIUM SERPL-MCNC: 9.6 MG/DL (ref 8.6–10.3)
CHLORIDE SERPL-SCNC: 106 MMOL/L (ref 98–110)
CHOLEST SERPL-MCNC: 177 MG/DL
CHOLEST/HDLC SERPL: 4.1 (CALC)
CO2 SERPL-SCNC: 26 MMOL/L (ref 20–32)
COLOR UR: YELLOW
CREAT SERPL-MCNC: 1.19 MG/DL (ref 0.7–1.35)
EGFRCR SERPLBLD CKD-EPI 2021: 67 ML/MIN/1.73M2
EOSINOPHIL # BLD AUTO: 209 CELLS/UL (ref 15–500)
EOSINOPHIL NFR BLD AUTO: 2.3 %
ERYTHROCYTE [DISTWIDTH] IN BLOOD BY AUTOMATED COUNT: 13 % (ref 11–15)
GLUCOSE SERPL-MCNC: 101 MG/DL (ref 65–99)
GLUCOSE UR QL STRIP: NEGATIVE
HCT VFR BLD AUTO: 46.4 % (ref 38.5–50)
HDLC SERPL-MCNC: 43 MG/DL
HGB BLD-MCNC: 15.4 G/DL (ref 13.2–17.1)
HGB UR QL STRIP: NEGATIVE
KETONES UR QL STRIP: NEGATIVE
LDLC SERPL CALC-MCNC: 105 MG/DL (CALC)
LEUKOCYTE ESTERASE UR QL STRIP: NEGATIVE
LYMPHOCYTES # BLD AUTO: 3749 CELLS/UL (ref 850–3900)
LYMPHOCYTES NFR BLD AUTO: 41.2 %
MCH RBC QN AUTO: 31.5 PG (ref 27–33)
MCHC RBC AUTO-ENTMCNC: 33.2 G/DL (ref 32–36)
MCV RBC AUTO: 94.9 FL (ref 80–100)
MONOCYTES # BLD AUTO: 846 CELLS/UL (ref 200–950)
MONOCYTES NFR BLD AUTO: 9.3 %
NEUTROPHILS # BLD AUTO: 4232 CELLS/UL (ref 1500–7800)
NEUTROPHILS NFR BLD AUTO: 46.5 %
NITRITE UR QL STRIP: NEGATIVE
NONHDLC SERPL-MCNC: 134 MG/DL (CALC)
PH UR STRIP: 5.5 [PH] (ref 5–8)
PLATELET # BLD AUTO: 356 THOUSAND/UL (ref 140–400)
PMV BLD REES-ECKER: 9.2 FL (ref 7.5–12.5)
POTASSIUM SERPL-SCNC: 3.9 MMOL/L (ref 3.5–5.3)
PROT SERPL-MCNC: 6.7 G/DL (ref 6.1–8.1)
PROT UR QL STRIP: NEGATIVE
PSA SERPL-MCNC: 0.95 NG/ML
RBC # BLD AUTO: 4.89 MILLION/UL (ref 4.2–5.8)
SODIUM SERPL-SCNC: 141 MMOL/L (ref 135–146)
SP GR UR STRIP: 1.01 (ref 1–1.03)
TRIGL SERPL-MCNC: 173 MG/DL
WBC # BLD AUTO: 9.1 THOUSAND/UL (ref 3.8–10.8)

## 2025-07-16 ENCOUNTER — OFFICE VISIT (OUTPATIENT)
Dept: PRIMARY CARE | Facility: CLINIC | Age: 70
End: 2025-07-16
Payer: COMMERCIAL

## 2025-07-16 VITALS
TEMPERATURE: 97.4 F | SYSTOLIC BLOOD PRESSURE: 130 MMHG | BODY MASS INDEX: 26.73 KG/M2 | DIASTOLIC BLOOD PRESSURE: 78 MMHG | RESPIRATION RATE: 18 BRPM | WEIGHT: 215 LBS | HEART RATE: 57 BPM | OXYGEN SATURATION: 98 % | HEIGHT: 75 IN

## 2025-07-16 DIAGNOSIS — R21 SKIN RASH: Primary | ICD-10-CM

## 2025-07-16 PROCEDURE — 3078F DIAST BP <80 MM HG: CPT | Performed by: INTERNAL MEDICINE

## 2025-07-16 PROCEDURE — 1159F MED LIST DOCD IN RCRD: CPT | Performed by: INTERNAL MEDICINE

## 2025-07-16 PROCEDURE — 3008F BODY MASS INDEX DOCD: CPT | Performed by: INTERNAL MEDICINE

## 2025-07-16 PROCEDURE — 3075F SYST BP GE 130 - 139MM HG: CPT | Performed by: INTERNAL MEDICINE

## 2025-07-16 PROCEDURE — 1036F TOBACCO NON-USER: CPT | Performed by: INTERNAL MEDICINE

## 2025-07-16 PROCEDURE — 99213 OFFICE O/P EST LOW 20 MIN: CPT | Performed by: INTERNAL MEDICINE

## 2025-07-16 RX ORDER — HYDROCORTISONE 25 MG/G
CREAM TOPICAL 2 TIMES DAILY PRN
Qty: 30 G | Refills: 2 | Status: SHIPPED | OUTPATIENT
Start: 2025-07-16 | End: 2026-07-16

## 2025-07-16 ASSESSMENT — PATIENT HEALTH QUESTIONNAIRE - PHQ9
1. LITTLE INTEREST OR PLEASURE IN DOING THINGS: NOT AT ALL
2. FEELING DOWN, DEPRESSED OR HOPELESS: NOT AT ALL
SUM OF ALL RESPONSES TO PHQ9 QUESTIONS 1 AND 2: 0

## 2025-07-16 NOTE — PROGRESS NOTES
"Subjective   Patient ID: Mu Wallace is a 69 y.o. male who presents for Rash.    He worked out in the yard 2 weeks ago , cleaned out flower , plant beds. He noticed pinkish rash on his right arm, inner aspect a week later, and he   Has few pinkish spots on his left arm as well, he thinks it is from a bugbite. Minimal itching. Questions about shingles   No known insect bite on right arm, no bubbles or blisters , discharge. He used HC cream otc  for comfort.   Is this spider bite rash ?         Review of Systems  See  HPI    Objective   /78 (BP Location: Left arm, Patient Position: Sitting, BP Cuff Size: Adult)   Pulse 57   Temp 36.3 °C (97.4 °F) (Temporal)   Resp 18   Ht 1.905 m (6' 3\")   Wt 97.5 kg (215 lb)   SpO2 98%   BMI 26.87 kg/m²     Physical Exam  Constitutional:       Appearance: Normal appearance.     Skin:     General: Skin is warm and dry.      Findings: Rash present.      Comments: Right inner arm with block of pinkish area , no insect bite allan   Left arm with few linear spots of pinkish rash     Neurological:      Mental Status: He is alert.         Assessment/Plan        Skin rash :  allergy Vs. Insect bite ( Not shingles )    Hydration, benadryl tab or cream   HC 2.5 %  cream topical   Contact precautions , wear full sleeve clothing doing yard work , wear gloves  "

## 2025-10-16 ENCOUNTER — APPOINTMENT (OUTPATIENT)
Dept: PRIMARY CARE | Facility: CLINIC | Age: 70
End: 2025-10-16
Payer: COMMERCIAL